# Patient Record
(demographics unavailable — no encounter records)

---

## 2025-01-27 NOTE — HISTORY OF PRESENT ILLNESS
[FreeTextEntry1] : Mary presents for initial consultation for breast reconstruction at the time of B/L mastectomy, referred by Dr. Tyson. She is accompanied by her . Recently diagnosed with left breast cancer, triple negative Stage II in July 2024. She was recently admitted to [facility] 1 month ago for neutropenic fever and pneumonia and lung abscess. She is currently taking PO Levaquin and Bactrim. She has a repeat chest CT today to reassess lung abscess and has a follow up appointment with her pulmonologist. She completed chemotherapy treatments in early December, she may need future chemotherapy depending on surgical pathology. She also may need radiation treatments. She had genetic testing done which was negative. +family history of breast cancer: paternal grandmother, no family history of ovarian cancer. No history of bleeding or clotting disorders. Bra size 34B.

## 2025-01-27 NOTE — PHYSICAL EXAM
[Bra Size: _______] : Bra Size: [unfilled] [de-identified] : Grade II ptosis, SN-N L 22cm, Rt 20cm, BD B/L 13cm, no nipple retraction or discharge  [de-identified] : Soft, non-tender, non-distended, +skin laxity, and minimal lipodystrophy but she does have adequate for ROSY flap reconstruction because her breasts are relatively small

## 2025-01-27 NOTE — CONSULT LETTER
[Consult Letter:] : I had the pleasure of evaluating your patient, [unfilled]. [Please see my note below.] : Please see my note below. [Consult Closing:] : Thank you very much for allowing me to participate in the care of this patient.  If you have any questions, please do not hesitate to contact me. [Sincerely,] : Sincerely, [FreeTextEntry2] : Hailey Tyson  [facility] [place] [address] [FreeTextEntry3] : Oren Lerman, MD, FACS Cosmetic & Reconstructive Plastic Surgery Associate , Department of Plastic Surgery - [facility] Associate Professor of Surgery - [facility] of [facility] Tel: [phone] Fax: [phone] [email or web address]

## 2025-01-27 NOTE — ASSESSMENT
[FreeTextEntry1] : Mary presents today to discuss bilateral breast reconstruction at the time of bilateral mastectomy. She was referred by Dr. Tyson. I reviewed with her options for both prosthetic as well as autologous reconstruction. I will coordinate her care with Dr. Tyson. She is currently taking antibiotics for a lung abscess and has a follow up CT ordered today. There is also a possibility that she will need radiation.  Because of the active infection I would prefer delayed reconstruction with no prosthesis placed at the time of the mastectomy however there is a possibility that she needs adjuvant radiation in which case delayed reconstruction will be significantly more difficult and she may need to have a latissimus flap.  She does have mild skin laxity and lipodystrophy of the abdominal donor site it is not excessive but likely could be used as a potential donor site if necessary.  I would still prefer delayed autologous with tissue expanders because she has minimal amount of tissue that will not be adequate for bilateral if she chooses delayed delayed and has significant skin resurfacing requirements after radiation.  Because her blood counts have normalized and she does not have any constitutional signs of infection it is possible that the abscess has cleared and that the infection has cleared in which case it may be safe to undergo immediate tissue expander placement.  After the repeat CAT scan and pulmonology consultation I will reevaluate and coordinate with Dr. Tyson.  We also discussed no reconstruction.   I explained to her that an implant reconstruction usually consists of two separate stages with two surgeries spaced about 4 months apart. At the time of the mastectomy, a tissue expander is placed underneath the pectoralis muscle or on top of the pectoralis muscle and is often reinforced with biologic mesh - acellular dermal matrix. We expand the tissue expander secondarily in the office by injecting saline into the implant percutaneously until the desired size breast mound is achieved. At that point a second stage operation is scheduled where we take her back to operating room and remove the tissue expander and place a permanent breast implant. The permanent prosthesis can be either silicone or saline. The first stage of the reconstruction is approximately 3 hours for one breast and 4-5 hours for a bilateral procedure, with a 1-2 night hospital stay and a four-week recovery. The second stage surgery is an outpatient procedure with a two-week recovery. I reviewed with her the risks of implant reconstruction including but not limited to bleeding, scarring, implant infection, implant rupture, capsule contracture, implant malposition, asymmetry, contour abnormality, and need for revision surgeries. I also discussed the FDA recommendations for silicone implant rupture screening with MRI as well as the details of BII - (breast implant illness) and ALCL.  I then reviewed with Mary the details of autologous tissue reconstruction, specifically using a free flap from her lower abdomen. This operation entails transplanting the skin, the fat, and blood vessels from the lower abdomen up to the chest wall where we reattach the artery and vein to blood vessels on the chest wall behind the rib to re-vascularize the tissue called a flap utilizing microsurgical techniques. We attempt to save all of the muscle fibers of the abdominal rectus muscle to minimize the chance of an abdominal wall weakness, bulge or hernia and only transfer the perforating blood vessels. This is called a ROSY flap. I explained to her the scar burden associated with this operation including the scars on the breasts and the lower abdomen and around the umbilicus. I explained to her that there is a risk of free flap loss and vessel thrombosis requiring a return to the operating room for emergent exploration in an attempt to salvage the flap. If we do lose a flap due to vascular compromise, we would likely place a tissue expander at the time of her returning to the operating room in order to preserve the breast skin envelope and perform a delayed reconstruction. I reviewed the risks of abdominal wall morbidity including but not limited to abdominal wall weakness, hernia, or bulge.  The ROSY flap is designed to minimize the risk of abdominal wall morbidity as opposed to a TRAM flap that cuts the abdominal wall muscle, but even a ROSY flap has a small chance of abdominal wall bulge, weakness or hernia. This operation is approximately 6 hours for one side and 9 hours for a bilateral procedure with a three-day hospitalization and a six-week recovery period. I also explained that there is a possibility of contour abnormality, asymmetry between the two breasts, and possible need for revision surgery. A surgery on the native contralateral breast to achieve symmetry in the setting of a unilateral mastectomy is usually required and often performed at the time of the implant exchange or nipple reconstruction. The nipple areolar reconstruction is performed at a later date as a separate procedure. We may also perform immediate T4 sensory nerve repair with AxoGen nerve interposition graft to treat postmastectomy hypesthesia and pain syndrome.

## 2025-01-28 NOTE — REASON FOR VISIT
[Initial] : an initial visit [Abnormal CXR/ Chest CT] : an abnormal CXR/ chest CT [Pneumonia] : pneumonia

## 2025-01-29 NOTE — DISCUSSION/SUMMARY
[FreeTextEntry1] : 62F with triple negative breast ca on chemo here for follow up after recent hospitalization for lung abscess  #Lung abscess #New hypoxemia  - CT chest from 12/2024 and 1/27/25 reviewed and discussed findings at length with patient and her . Her previously noted large L lung abscess has shown a decrease in size and now no longer has air-fluid level. Also with reduction in size of L basilar infiltrate but now with central cavitation as well. She also has new RUL small area of nodular infiltrate which could be inflammatory in etiology as it is an acutely new finding - Based on CT findings, she is showing clinical response to the Abx therapy and recommend to continue for another 4 weeks of Abx therapy at least and will plan for repeat CT chest in 4 weeks to continue following abscess resolution - Unfortunately, during her time on prolonged Abx, she is not able to continue her chemotherapy - She is tentatively scheduled to get bilateral mastectomy next week by Dr. Tyson. However, on my exam in office today she was hypoxemic at rest with only minimal improvement with rest. When I reviewed her hospital records, she was weaned to RA with SpO2 95-96% on day of discharge. I then ambulated patient in office and she showed acute desatruation to 74% with HR elevation to 140s within 2 minutes of walking. This is new hypoxemia which is worrisome, especially for acute PE. I advised Pt to go to [facility] ED for CTA chest imaging to be done stat in order to rule out acute PE. I have called [facility] ED and Dr. Medina (inpatient Pulm service) to alert them of patient - I have also reached out to Siva Nelson (Oncologist) and Jah (breast surgeon) to provide update - The decision to proceed with her planned double mastectomy will depend on findings of her CTA chest.

## 2025-01-29 NOTE — DISCUSSION/SUMMARY
[FreeTextEntry1] : 62F with triple negative breast ca on chemo here for follow up after recent hospitalization for lung abscess  #Lung abscess #New hypoxemia  - CT chest from 12/2024 and 1/27/25 reviewed and discussed findings at length with patient and her . Her previously noted large L lung abscess has shown a decrease in size and now no longer has air-fluid level. Also with reduction in size of L basilar infiltrate but now with central cavitation as well. She also has new RUL small area of nodular infiltrate which could be inflammatory in etiology as it is an acutely new finding - Based on CT findings, she is showing clinical response to the Abx therapy and recommend to continue for another 4 weeks of Abx therapy at least and will plan for repeat CT chest in 4 weeks to continue following abscess resolution - Unfortunately, during her time on prolonged Abx, she is not able to continue her chemotherapy - She is tentatively scheduled to get bilateral mastectomy next week by Dr. Tyson. However, on my exam in office today she was hypoxemic at rest with only minimal improvement with rest. When I reviewed her hospital records, she was weaned to RA with SpO2 95-96% on day of discharge. I then ambulated patient in office and she showed acute desatruation to 74% with HR elevation to 140s within 2 minutes of walking. This is new hypoxemia which is worrisome, especially for acute PE. I advised Pt to go to [facility] ED for CTA chest imaging to be done stat in order to rule out acute PE. I have called [facility] ED and Dr. Medina (inpatient Pulm service) to alert them of patient - I have also reached out to Siva Nelson (Oncologist) and Jah (breast surgeon) to provide update - The decision to proceed with her planned double mastectomy will depend on findings of her CTA chest.

## 2025-01-29 NOTE — ADDENDUM
[FreeTextEntry1] : Patient sent to [facility] ED 1/28/25 for acute hypoxemia noted in office. CTA chest was done and acute PE ruled out. My colleague Dr. Medina evaluated her in the ED and her SpO2 improved at rest and was maintained at 96 to 97% on ambulation. She was discharged from the ED.  I spoke with Pt today over the phone to follow up. She is feeling well and is scheduled for bilateral mastectomy 2/13  There are no absolute pulmonary contraindications to proceed with planned surgery. She will need to be maintained on her current antibiotic regimen for another 4 weeks and will plan to repeat her CT chest again in 1 month to follow her abscesses to resolution.

## 2025-01-29 NOTE — HISTORY OF PRESENT ILLNESS
[Former] : former [Never] : never [TextBox_4] :  Ms. SUBHA SANTANA is a 62 year old F here for post-hospital followup. She is accompanied by her .  I first met Ms. Santana during her hospitalization at [facility] 12/2024 for pneumonia c/b L lung abscess. Her sputum Cx grew out MSSA and Pseudomonas. She was discharged on bactrim and levaquin. She just had repeat CT chest 1/27/25.  She notes that she is feeling improved since discharge. She is still fatigued and gets short of breath with minimal exertion. She was not discharged on supplemental O2. She has intermittent cough that is productive. She is tentatively scheduled for double mastectomy next week.  She previously smoked 2 cigarettes daily x50 yrs, quit 7/2024. Prior to her hospitalization, she has never had respiratory issues or been hospitalized for infections or breathing issues.  Of note, on exam today she had resting SpO2 89% with improved to 92-92% at rest. HR was 100-110s.

## 2025-01-29 NOTE — ADDENDUM
[FreeTextEntry1] : Patient sent to [facility] ED 1/28/25 for acute hypoxemia noted in office. CTA chest was done and acute PE ruled out. My colleague Dr. Medina evaluated her in the ED and her SpO2 improved at rest and was maintained at 96 to 97% on ambulation. She was discharged from the ED.  I spoke with Pt today over the phone to follow up. She is feeling well and is scheduled for bilateral mastectomy 2/13  There are no absolute pulmonary contraindications to proceed with planned surgery. She will need to be maintained on her current antibiotic regimen for another 4 weeks and will plan to repeat her CT chest again in 1 month to follow her abscesses to resolution.

## 2025-01-29 NOTE — HISTORY OF PRESENT ILLNESS
[Former] : former [Never] : never [TextBox_4] :  Ms. SUBHA SANTANA is a 62 year old F here for post-hospital followup. She is accompanied by her .  I first met Ms. Santana during her hospitalization at [facility] 12/2024 for pneumonia c/b L lung abscess. Her sputum Cx grew out MSSA and Pseudomonas. She was discharged on bactrim and levaquin. She just had repeat CT chest 1/27/25.  She notes that she is feeling improved since discharge. She is still fatigued and gets short of breath with minimal exertion. She was not discharged on supplemental O2. She has intermittent cough that is productive. She is tentatively scheduled for double mastectomy next week.  She previously smoked 2 cigarettes daily x50 yrs, quit 7/2024. Prior to her hospitalization, she has never had respiratory issues or been hospitalized for infections or breathing issues.  Of note, on exam today she had resting SpO2 89% with improved to 92-92% at rest. HR was 100-110s.

## 2025-01-29 NOTE — PHYSICAL EXAM
[No Acute Distress] : no acute distress [No Resp Distress] : no resp distress [No Acc Muscle Use] : no acc muscle use [Clear to Auscultation Bilaterally] : clear to auscultation bilaterally [No Clubbing] : no clubbing [No Edema] : no edema [Oriented x3] : oriented x3 [Normal Affect] : normal affect [TextBox_54] : tachycardic; no murmurs

## 2025-02-24 NOTE — HISTORY OF PRESENT ILLNESS
[Former] : former [Never] : never [TextBox_4] :  Ms. SUBHA SANTANA is a 62 year old F here for post-hospital followup. She is accompanied by her .  I first met Ms. Santana during her hospitalization at [facility] 12/2024 for pneumonia c/b L lung abscess. Her sputum Cx grew out MSSA and Pseudomonas. She was discharged on bactrim and levaquin. She just had repeat CT chest 1/27/25.  She notes that she is feeling improved since discharge. She is still fatigued and gets short of breath with minimal exertion. She was not discharged on supplemental O2. She has intermittent cough that is productive. She is tentatively scheduled for double mastectomy next week.  She previously smoked 2 cigarettes daily x50 yrs, quit 7/2024. Prior to her hospitalization, she has never had respiratory issues or been hospitalized for infections or breathing issues.  Of note, on exam today she had resting SpO2 89% with improved to 92-92% at rest. HR was 100-110s.  2/2025 Ms. SANTANA returns today for follow-up. She is accompanied by her . She underwent bilateral mastectomy about 2 weeks ago and is healing well. She was switched from bactrim to doxycycline by ID given rash development. She just provided new sputum sample for Cx. She has no new respiratory complaints.

## 2025-02-24 NOTE — DISCUSSION/SUMMARY
[FreeTextEntry1] : 62F with triple negative breast ca on chemo here for follow up after recent hospitalization for lung abscess  #Lung abscess  - CT chest from 12/2024 and 1/27/25 reviewed and discussed findings at length with patient and her  at previous visit - Awaiting repeat CT chest in the next week and will review findings after imaging is complete - To continue Abx until repeat CT is complete.

## 2025-02-24 NOTE — HISTORY OF PRESENT ILLNESS
[FreeTextEntry1] : Mary is 12 days post op, S/P B/L breast reconstruction with tissue expanders on 2/12/25. She denies f/c/n/v, reports taking tylenol, ibuprofen and valium for pain. Her STEPHANIE dressings and B/L breast drains were removed by Dr. Tyson last week. She is currently taking antibiotics for a lung abscess and has a f/u Cat scan per pulmonologist to reassess.

## 2025-02-24 NOTE — ASSESSMENT
[FreeTextEntry1] : Mary is healing well 12 days post op, expanded today to 180 cc (300 cc expanders)  -f/u in 3 weeks with Kelley to continue expansion  -post op instructions reviewed  -shower regularly  -sports bra -PT script given  -aquaphor to incisions  -activity restrictions reviewed

## 2025-02-24 NOTE — ADDENDUM
[FreeTextEntry1] : Patient sent to [facility] ED 1/28/25 for acute hypoxemia noted in office. CTA chest was done and acute PE ruled out. My colleague Dr. Medina evaluated her in the ED and her SpO2 improved at rest and was maintained at 96 to 97% on ambulation. She was discharged from the ED.  I spoke with Pt today over the phone to follow up. She is feeling well and is scheduled for bilateral mastectomy 2/13  There are no absolute pulmonary contraindications to proceed with planned surgery. She will need to be maintained on her current antibiotic regimen for another 4 weeks and will plan to repeat her CT chest again in 1 month to follow her abscesses to resolution.

## 2025-02-24 NOTE — PHYSICAL EXAM
[de-identified] : B/L Alize in good position, incisions healing well, no infections, surgical absence of NACs, aspirated 15 cc serosanguineous fluid on right breast, and 25 cc on left breast. Air removed and expanded today to 180 cc B/L

## 2025-03-10 NOTE — SURGICAL HISTORY
[de-identified] : 2/12/25[facility]

## 2025-03-10 NOTE — ASSESSMENT
[FreeTextEntry1] : Mary has a L axilla post op seroma, 70cc serous fluid aspirated by Dr. Lerman today and an additional 40cc of serous fluid over Left TE. These two areas appear to be communicating. She has a f/u appt with me next week to reassess but will call me if fluid reaccumulates sooner, and we will consider placing a drain.  B/L Alize expanded today to 240 cc (300 cc expanders)  -post op instructions reviewed  -shower regularly  -sports bra with gauze bolster to left axilla  -aquaphor to incisions  -discussed plan with Dr. Lerman and Dr. Tyson updated

## 2025-03-10 NOTE — HISTORY OF PRESENT ILLNESS
[FreeTextEntry1] : Mary is 1 month post op, S/P B/L breast reconstruction with tissue expanders on 2/12/25. She denies f/c/n/v. She continues taking antibiotics for a lung abscess and follows up with Dr. Tala Villalobos, pulmonologist. She was seen last week by Dr. Tyson, who aspirated a left axilla seroma. Today, she reports it has reaccumulated over the last 3 days.

## 2025-03-19 NOTE — HISTORY OF PRESENT ILLNESS
[FreeTextEntry1] : Mary is 1 month post op, S/P B/L breast reconstruction with tissue expanders on 2/12/25. She denies f/c/n/v. She continues taking antibiotics for a lung abscess and follows up with Dr. Tala Villalobos, pulmonologist. She had US guided pigtail placed last week by IR for recurrent left breast / axilla seroma. --> 40-70cc a day clear serous fluid. She reports that she does not think she will need any more chemo or radiation - [facility] Keytruda.

## 2025-03-19 NOTE — ASSESSMENT
[FreeTextEntry1] : Mary is healing well - will monitor pigtail output and remove when decreased and then finish tissue expansion. Her next suregry will be TE --> implant exchange in ~ 3-4 months  B/L Alize expanded today to 240 cc (300 cc expanders)  f/u with OFE Santana for pigtail removal when output decreased.  -shower regularly  -sports bra with gauze bolster to left axilla  -aquaphor to incisions

## 2025-03-19 NOTE — PHYSICAL EXAM
[de-identified] : B/L Alize in good position, incisions healing well, no infections, surgical absence of NACs, left breast with pigtail in place upper outer quadrant, BL breasts --> no sign of any collection or infection

## 2025-03-31 NOTE — HISTORY OF PRESENT ILLNESS
[FreeTextEntry1] : Mary is 1 month post op, S/P B/L breast reconstruction with tissue expanders on 2/12/25. She denies f/c/n/v. She continues taking antibiotics for a lung abscess and follows up with Dr. Tala Villalobos, pulmonologist. She had US guided pigtail placed last week by IR for recurrent left breast / axilla seroma. --> 40-70cc a day clear serous fluid. She reports that she does not think she will need any more chemo or radiation - [facility] Keytruda.

## 2025-03-31 NOTE — PHYSICAL EXAM
[de-identified] : B/L Alize in good position, incisions healing well, no infections, surgical absence of NACs, left breast with pigtail in place upper outer quadrant, BL breasts --> no sign of any collection or infection - Removed the pigtail catheter today

## 2025-03-31 NOTE — HISTORY OF PRESENT ILLNESS
[FreeTextEntry1] : Mary is 1 month post op, S/P B/L breast reconstruction with tissue expanders on 2/12/25. She denies f/c/n/v. She continues taking antibiotics for a lung abscess and follows up with Dr. Tala Villalobos, pulmonologist. She had US guided pigtail placed last week by IR for recurrent left breast / axilla seroma. --> 40-70cc a day clear serous fluid. She reports that she does not think she will need any more chemo or radiation - [facility]

## 2025-03-31 NOTE — ASSESSMENT
[FreeTextEntry1] : Mary is healing well - removed pigtail drain in office today. Finishged tissue expansion.  B/L Alize expanded today to 300 cc (300 cc expanders)  she is traveling to [place] for two weeks tomorrow f/u with OFE Santana upon her return can then plan 2nd stage surgery TE --> implant exchange in ~ June (~4 monthsPO)

## 2025-03-31 NOTE — SURGICAL HISTORY
[de-identified] : 2/12/25[facility]

## 2025-03-31 NOTE — PHYSICAL EXAM
[de-identified] : B/L Alize in good position, incisions healing well, no infections, surgical absence of NACs, left breast with pigtail in place upper outer quadrant, BL breasts --> no sign of any collection or infection - Removed the pigtail catheter today

## 2025-04-23 NOTE — PHYSICAL EXAM
[de-identified] : B/L Alize in good position, incisions well-healed, no infections, surgical absence of NACs, no sign of any collection or infection, no re-accumulation of fluid to the left axilla

## 2025-04-23 NOTE — ASSESSMENT
[FreeTextEntry1] : Mary is well-healed, there is no re-accumulation of fluid to the left axilla. No additional expansion today  B/L Alize expanded to 300 cc (300 cc expanders)  She has a surgical date for June for B/L TE to implant exchange and a f/u appt with Dr. Lerman in May to finalize surgical plan  -continue PT

## 2025-04-23 NOTE — PHYSICAL EXAM
[de-identified] : B/L Alize in good position, incisions well-healed, no infections, surgical absence of NACs, no sign of any collection or infection, no re-accumulation of fluid to the left axilla

## 2025-04-23 NOTE — SURGICAL HISTORY
[employer] Approved    Prior Authorization Number: [id number]  Dates of approval: 11/23/2020-11/23/2021  Filling Pharmacy: [facility]  Additional Information: Pharmacy notified of approval.        [de-identified] : [facility]

## 2025-04-23 NOTE — SURGICAL HISTORY
[de-identified] : [facility]

## 2025-04-23 NOTE — HISTORY OF PRESENT ILLNESS
[FreeTextEntry1] : Mary is S/P B/L breast reconstruction with tissue expanders on 2/12/25. She had US guided pigtail placed last week by IR for recurrent left breast / axilla seroma, drain was removed 3 weeks ago in-office. No complaints, feels well. She denies f/c/n/v. She continues taking antibiotics for a lung abscess and follows up with Dr. Tala Villalobos, pulmonologist. She had a recent CT chest for lung abscess and is waiting for results. She reports that she will not need any more chemo or radiation.

## 2025-05-12 NOTE — PHYSICAL EXAM
[de-identified] : Right chest mediport  [de-identified] : B/L Alize in good position, incisions well-healed, no infections, surgical absence of NACs, no sign of any collection or infection, no re-accumulation of fluid to the left axilla

## 2025-05-12 NOTE — SURGICAL HISTORY
[de-identified] : 2/12/25[facility]

## 2025-05-12 NOTE — SURGICAL HISTORY
[de-identified] : [facility]

## 2025-05-12 NOTE — PHYSICAL EXAM
[de-identified] : B/L Alize in good position, incisions well-healed, no infections, surgical absence of NACs, no sign of any collection or infection, no re-accumulation of fluid to the left axilla  [de-identified] : Right chest mediport

## 2025-05-12 NOTE — HISTORY OF PRESENT ILLNESS
[FreeTextEntry1] : Mary is S/P B/L breast reconstruction with tissue expanders on 2/12/25. No complaints, feels well. She denies f/c/n/v. 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 Tala Villalobos, pulmonologist. Next chest CT is in 2 weeks. She reports that she will not need any more chemo or radiation. She has 2nd stage surgery planned for [facility] - Implant exchange

## 2025-05-12 NOTE — ASSESSMENT
[FreeTextEntry1] : Mary is well-healed, No additional expansion today. We reviewed and finalized her surgical plan today.  B/L Alize expanded to 300 cc (300 cc expanders)  She has a surgical date June 16th for B/L TE to silicone implant exchange I reviewed plan for surgery and answered all her questions

## 2025-06-23 NOTE — ASSESSMENT
[FreeTextEntry1] : Mary is healing well, 1 week post op TE to implant exchange B/L  -post op instructions reviewed  -shower regularly -sports bra -aquaphor to incisions when steris fall off  -activity restrictions reviewed -RTC Dr. Lerman in 1 month

## 2025-06-23 NOTE — HISTORY OF PRESENT ILLNESS
[FreeTextEntry1] : Mary is 1 week post op, S/P B/L TE to implant exchange. She feels well, no complaints, denies f/c/n/v, reports pain is controlled. She has stopped her oral abx for lung abscess per her pulmonologist, next CT is in 3 months.

## 2025-06-23 NOTE — SURGICAL HISTORY
[de-identified] : [facility]  [de-identified] : 6/16/25: B/L TE to implant exchange

## 2025-06-23 NOTE — PHYSICAL EXAM
[de-identified] : B/L breasts are soft, implants in good position, incisions c/d/i, good contour and symmetry, no infections or collections

## 2025-07-14 NOTE — ASSESSMENT
[FreeTextEntry1] : Mary continues to heal well 1 month post op B/L TE to implant exchange  -moisturize incisions -return to normal activities  -will be ready for nipple tattoo in 2 months, references given  -RTC in 6 months

## 2025-07-14 NOTE — SURGICAL HISTORY
[de-identified] : [facility]  [de-identified] : 6/16/25: B/L TE to implant exchange

## 2025-07-14 NOTE — HISTORY OF PRESENT ILLNESS
[FreeTextEntry1] : Mary is 4 weeks post op, S/P B/L TE to implant exchange on 6/16/25. She feels well, no complaints.

## 2025-07-14 NOTE — PHYSICAL EXAM
[de-identified] : B/L breasts are soft, implants in good position, incisions are well-healed, good contour and symmetry, surgical absence of NACs, no infections or collections, mild rippling to B/L upper inner pole

## 2025-07-14 NOTE — PHYSICAL EXAM
[de-identified] : B/L breasts are soft, implants in good position, incisions are well-healed, good contour and symmetry, surgical absence of NACs, no infections or collections, mild rippling to B/L upper inner pole

## 2025-07-14 NOTE — SURGICAL HISTORY
[de-identified] : [facility]  [de-identified] : 6/16/25: B/L TE to implant exchange